# Patient Record
Sex: MALE | Race: OTHER | Employment: FULL TIME | URBAN - METROPOLITAN AREA
[De-identification: names, ages, dates, MRNs, and addresses within clinical notes are randomized per-mention and may not be internally consistent; named-entity substitution may affect disease eponyms.]

---

## 2021-04-14 ENCOUNTER — HOSPITAL ENCOUNTER (OUTPATIENT)
Dept: PHYSICAL THERAPY | Age: 51
Discharge: HOME | End: 2021-04-14
Payer: COMMERCIAL

## 2021-04-14 PROCEDURE — 97112 NEUROMUSCULAR REEDUCATION: CPT

## 2021-04-14 PROCEDURE — 97162 PT EVAL MOD COMPLEX 30 MIN: CPT
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
## 2021-04-14 NOTE — PROGRESS NOTES
PHYSICAL THERAPY - DAILY TREATMENT NOTE     Patient Name: Radha Officer        Date: 2021  : 1970   YES Patient  Verified  Visit #:   1     (5)  Insurance: Payor: Calderon Garcias / Plan: Allyson Roach RPN / Product Type: Commerical /      In time: 410 Out time: 500   Total Treatment Time: 50     Medicare/[facility] Time Tracking (below)   Total Timed Codes (min):   1:1 Treatment Time:       TREATMENT AREA =  Low back pain [M54.5]  Pain in right shoulder [M25.511]  Concussion [S06.0X9A]    SUBJECTIVE    Pain Level (on 0 to 10 scale):  4  / 10   Medication Changes/New allergies or changes in medical history, any new surgeries or procedures?     NO    If yes, update Summary List   Subjective Functional Status/Changes:  []  No changes reported     See eval /POC         OBJECTIVE  Modalities Rationale:     decrease pain to improve patient's ability to return to PLOF      min [] Estim, type/location:                                      []  att     []  unatt     []  w/US     []  w/ice    []  w/heat    min []  Mechanical Traction: type/lbs                   []  pro   []  sup   []  int   []  cont    []  before manual    []  after manual    min []  Ultrasound, settings/location:      min []  Iontophoresis w/ dexamethasone, location:                                               []  take home patch       []  in clinic    min []  Ice     []  Heat    location/position:     min []  Vasopneumatic Device, press/temp:     min []  Other:    [] Skin assessment post-treatment (if applicable):    []  intact    []  redness- no adverse reaction     []redness  adverse reaction:       min Therapeutic Exercise:  [x]  See flow sheet   Rationale:      increase ROM and increase strength to improve the patients ability to return to PLOF      min Manual Therapy: Technique:      [] S/DTM []IASTM []PROM [] Passive Stretching   []manual TPR    []Jt manipulation:Gr I [] II []  III [] IV[]  []REIL with manual OP  Treatment Area: Rationale:      decrease pain, increase ROM, increase tissue extensibility and decrease trigger points to improve patient's ability to return to PLOF    10 min Neuromuscular Re-ed: [x]  See flow sheet   Rationale:      improve coordination, improve balance, increase proprioception and dec dizziness to improve the patients ability to return to PLOF        min Self Care:    Rationale:    increase ROM, increase strength and improve coordination to improve the patients ability to return to PLOF    Billed With/As:   [] TE   [] TA   [] Neuro   [] Self Care Patient Education: [x] Review HEP    [] Progressed/Changed HEP based on:   [] positioning   [] body mechanics   [] transfers   [] heat/ice application    [] other:        Other Objective/Functional Measures:    See eval/ POC     Post Treatment Pain Level (on 0 to 10) scale:   4 / 10     ASSESSMENT    X  See POC     PLAN    [x]  Upgrade activities as tolerated {YES) Continue plan of care   []  Discharge due to :    []  Other:      Therapist: Chelsie Barnett PT    Date: 4/14/2021 Time: 5:28 PM     Future Appointments   Date Time Provider Lena Quesada   4/19/2021  6:00 PM [facility] [place] [facility]   4/21/2021  3:30 PM Colleen Garcia, PT [facility]   4/26/2021  6:00 PM [facility] [place] [facility]   4/29/2021  6:00 PM [facility]

## 2021-04-19 ENCOUNTER — HOSPITAL ENCOUNTER (OUTPATIENT)
Dept: PHYSICAL THERAPY | Age: 51
Discharge: HOME | End: 2021-04-19
Payer: COMMERCIAL

## 2021-04-19 PROCEDURE — 97110 THERAPEUTIC EXERCISES: CPT | Performed by: GENERAL ACUTE CARE HOSPITAL

## 2021-04-19 NOTE — PROGRESS NOTES
PHYSICAL THERAPY - DAILY TREATMENT NOTE     Patient Name: Radha Duran        Date: 2021  : 1970   YES Patient  Verified  Visit #:   2     Insurance: Payor: Rusty Jacinto / Plan: [id number] [place] RPN / Product Type: Commerical /      In time: 6:02 Out time: 6:47   Total Treatment Time: 45     Medicare/[employer] Time Tracking (below)   Total Timed Codes (min):  45 1:1 Treatment Time:  45     TREATMENT AREA =  Low back pain [M54.5]  Pain in right shoulder [M25.511]  Concussion [S06.0X9A]    SUBJECTIVE    Pain Level (on 0 to 10 scale):  5-6  / 10   Medication Changes/New allergies or changes in medical history, any new surgeries or procedures? NO    If yes, update Summary List   Subjective Functional Status/Changes:  []  No changes reported     Pt reports going out of town to relax this weekend, which helped with his neck pain. Reports stressful life events causing increased pain upon return. Having ongoing neck pain and stiffness. Reports neck > low back pain. OBJECTIVE    45 min Therapeutic Exercise:  [x]  See flow sheet   Rationale:      increase ROM and increase strength to improve the patients ability to perform ADL's and work related tasks      Billed With/As:   [] TE   [] TA   [] Neuro   [] Self Care Patient Education: [x] Review HEP    [] Progressed/Changed HEP based on:   [] positioning   [] body mechanics   [] transfers   [] heat/ice application    [] other:        Other Objective/Functional Measures:    Initiated exercises per FS     Post Treatment Pain Level (on 0 to 10) scale:   4  / 10     ASSESSMENT    Assessment/Changes in Function:     Fair tolerance to initial treatment session. Pt required 100% VC's for all newly introduced exercises. Pt was heavily cued to perform exercises in a pain free range, but patient frequently wanting to push himself further. Educated on importance of posture to reduce strain on neck, especially while at work, and to avoid forward bending.       [] See Progress Note/Recertification   Patient will continue to benefit from skilled PT services to modify and progress therapeutic interventions, address functional mobility deficits, address ROM deficits, address strength deficits, analyze and address soft tissue restrictions, analyze and cue movement patterns, analyze and modify body mechanics/ergonomics, assess and modify postural abnormalities, address imbalance/dizziness and instruct in home and community integration to attain remaining goals. Progress toward goals / Updated goals: · Short Term Goals: To be accomplished in 4-6  treatments:  1. Patient will report at least 25% reduction of symptoms with ADLs. Progressing: reduced pain post exercises today (4/19/21)  2. Patient will be independent and complaint with HEP TID to improve mobility,  reduce imbalance and dizziness with ADLs. 3. Increase L shoulder flex/ scap >= 15 degrees for improved OH reach,   4. Increase Lx ROM >= 50% in all planes for increased ADL participation  · Long Term Goals: To be accomplished in 10-12 treatments:  1. Patient will report at least 50% reduction of symptoms with ADLs. 2. Patient will be independent with self progression of HEP and demonstrate willingness to continue HEP after D/C to maximize/maintain gains in functional mobility. 3. CSE will improve to less than or equal to 25/132 points to demonstrate significant reduction of dizziness and imbalance with ADLs. 4. Increase FGA score >= 25/30 to indicate improved functional gait. 5. Restore ROM is CS, LS, L shoulder >= 75% of WNL for improved ADL/ work participation.        PLAN    [x]  Upgrade activities as tolerated YES Continue plan of care   []  Discharge due to :    []  Other:      Therapist: Chiquis Guerrero PT    Date: 4/19/2021 Time: 10:43 AM     Future Appointments   Date Time Provider Lena Quesada   4/19/2021  6:00 PM Aster6 [place] PT [address] [place]   4/21/2021  3:30 PM Eav Shrestha PT [facility] [address] [place] 4/26/2021  6:00 PM [facility] [place] [facility]   4/29/2021  6:00 PM Hinda Lesch, PTA [facility]

## 2021-04-22 ENCOUNTER — HOSPITAL ENCOUNTER (OUTPATIENT)
Dept: PHYSICAL THERAPY | Age: 51
Discharge: HOME | End: 2021-04-22
Payer: COMMERCIAL

## 2021-04-22 PROCEDURE — 97110 THERAPEUTIC EXERCISES: CPT

## 2021-04-22 NOTE — PROGRESS NOTES
PHYSICAL THERAPY - DAILY TREATMENT NOTE     Patient Name: Haider Aleman        Date: 2021  : 1970   YES Patient  Verified  Visit #:   3   Insurance: Payor: Megan Mustapha / Plan: Warner KELLEY / Product Type: Commerical /      In time: 5:05 Out time: 5:50   Total Treatment Time: 45     Medicare/[facility] Time Tracking (below)   Total Timed Codes (min):   1:1 Treatment Time:       TREATMENT AREA =  Low back pain [M54.5]  Pain in right shoulder [M25.511]  Concussion [S06.0X9A]    SUBJECTIVE    Pain Level (on 0 to 10 scale):  -6  / 10   Medication Changes/New allergies or changes in medical history, any new surgeries or procedures? NO    If yes, update Summary List   Subjective Functional Status/Changes:  []  No changes reported     \"Headaches everyday but less intense. Sensitive to light and sound. Pain in the R side low back that comes and goes everyday; worse in the mornings. I take ice baths almost every day at home. \"        OBJECTIVE    45 min Therapeutic Exercise:  [x]  See flow sheet   Rationale:      increase ROM and increase strength to improve the patients ability to perform ADL's and work related tasks      Billed With/As:   [x] TE   [] TA   [] Neuro   [] Self Care Patient Education: [x] Review HEP    [] Progressed/Changed HEP based on:   [] positioning   [] body mechanics   [] transfers   [] heat/ice application    [] other:        Other Objective/Functional Measures:    Challenged with SLS. Increases headache.      Post Treatment Pain Level (on 0 to 10) scale:    10     ASSESSMENT    Assessment/Changes in Function:     Functional Impairments: daily headaches and R>L LBP     []  See Progress Note/Recertification   Patient will continue to benefit from skilled PT services to modify and progress therapeutic interventions, address functional mobility deficits, address ROM deficits, address strength deficits, analyze and address soft tissue restrictions, analyze and cue movement patterns, analyze and modify body mechanics/ergonomics, assess and modify postural abnormalities, address imbalance/dizziness and instruct in home and community integration to attain remaining goals. Progress toward goals / Updated goals: · Short Term Goals: To be accomplished in 4-6  treatments:  1. Patient will report at least 25% reduction of symptoms with ADLs. Progressing: reduced pain post exercises today (4/19/21)  2. Patient will be independent and complaint with HEP TID to improve mobility,  reduce imbalance and dizziness with ADLs. 3. Increase L shoulder flex/ scap >= 15 degrees for improved OH reach,   4. Increase Lx ROM >= 50% in all planes for increased ADL participation  · Long Term Goals: To be accomplished in 10-12 treatments:  1. Patient will report at least 50% reduction of symptoms with ADLs. 2. Patient will be independent with self progression of HEP and demonstrate willingness to continue HEP after D/C to maximize/maintain gains in functional mobility. 3. CSE will improve to less than or equal to 25/132 points to demonstrate significant reduction of dizziness and imbalance with ADLs. 4. Increase FGA score >= 25/30 to indicate improved functional gait. 5. Restore ROM is CS, LS, L shoulder >= 75% of WNL for improved ADL/ work participation.        PLAN    [x]  Upgrade activities as tolerated YES Continue plan of care   []  Discharge due to :    []  Other:      Therapist: Katiuska Coffman PTA    Date: 4/22/2021 Time: 10:43 AM     Future Appointments   Date Time Provider Lena Quesada   4/22/2021  5:15 PM Frederick Vu PTA [facility]   4/26/2021  6:00 PM [facility]   4/29/2021  6:00 PM Frederick Vu PTA [facility]

## 2021-04-26 ENCOUNTER — HOSPITAL ENCOUNTER (OUTPATIENT)
Dept: PHYSICAL THERAPY | Age: 51
Discharge: HOME | End: 2021-04-26
Payer: COMMERCIAL

## 2021-04-26 PROCEDURE — 97110 THERAPEUTIC EXERCISES: CPT | Performed by: GENERAL ACUTE CARE HOSPITAL

## 2021-04-26 NOTE — PROGRESS NOTES
PHYSICAL THERAPY - DAILY TREATMENT NOTE     Patient Name: Leeann Sánchez        Date: 2021  : 1970   YES Patient  Verified  Visit #:   3 of     Insurance: Payor: Caron Cid / Plan: Eliana Beckwith RPN / Product Type: Commerical /      In time: 6:03 Out time: 6:52   Total Treatment Time: 49     Medicare/[facility] Time Tracking (below)   Total Timed Codes (min):   1:1 Treatment Time:       TREATMENT AREA =  Low back pain [M54.5]  Pain in right shoulder [M25.511]  Concussion [S06.0X9A]    SUBJECTIVE    Pain Level (on 0 to 10 scale): 4 / 10   Medication Changes/New allergies or changes in medical history, any new surgeries or procedures? NO    If yes, update Summary List   Subjective Functional Status/Changes:  []  No changes reported     Pt reports mild improvement in pain, but states this fluctuates. Taking ice baths for pain management. OBJECTIVE    49 min Therapeutic Exercise:  [x]  See flow sheet   Rationale:      increase ROM and increase strength to improve the patients ability to perform ADL's and work related tasks      Billed With/As:   [x] TE   [] TA   [] Neuro   [] Self Care Patient Education: [x] Review HEP    [] Progressed/Changed HEP based on:   [] positioning   [] body mechanics   [] transfers   [] heat/ice application    [] other:        Other Objective/Functional Measures:    Shoulder scap AROM: L 150, R 120  C/S AROM: flex 45, ext 33  L/S AROM: flex to mid shin, ext 50%, SB 50%, Rot 50%   Improvements: reduced HA's, slowly improving mobility        Post Treatment Pain Level (on 0 to 10) scale:    10      ASSESSMENT    Assessment/Changes in Function:     Pt reports reduced headaches, notes they appear to be exacerbated by stress. Pt reports ongoing occasional numbness into bilateral finger tips - educated on importance of posture. PN due next visit for insurance auth. Discussed continuation of therapy and patient is considering DC to continue exercises at home.  Plan to have discussion [place] about need for continuation v readiness for DC.      []  See Progress Note/Recertification   Patient will continue to benefit from skilled PT services to modify and progress therapeutic interventions, address functional mobility deficits, address ROM deficits, address strength deficits, analyze and address soft tissue restrictions, analyze and cue movement patterns, analyze and modify body mechanics/ergonomics, assess and modify postural abnormalities, address imbalance/dizziness and instruct in home and community integration to attain remaining goals. Progress toward goals / Updated goals: · Short Term Goals: To be accomplished in 4-6  treatments:  1. Patient will report at least 25% reduction of symptoms with ADLs. Progressing: notes improved mobility and reduced HAs (4/26/21)  2. Patient will be independent and complaint with HEP to improve mobility,  reduce imbalance and dizziness with ADLs. Met: pt notes daily to BID compliance (4/26/2021)  3. Increase L shoulder flex/ scap >= 15 degrees for improved OH reach Met: Shoulder scap AROM: L 150, R 120 (4/26/21)  4. Increase Lx ROM >= 50% in all planes for increased ADL participation Met: L/S AROM: flex to mid shin, ext 50%, SB 50%, Rot 50% (4/26/21)  · Long Term Goals: To be accomplished in 10-12 treatments:  1. Patient will report at least 50% reduction of symptoms with ADLs. 2. Patient will be independent with self progression of HEP and demonstrate willingness to continue HEP after D/C to maximize/maintain gains in functional mobility. 3. CSE will improve to less than or equal to 25/132 points to demonstrate significant reduction of dizziness and imbalance with ADLs. 4. Increase FGA score >= 25/30 to indicate improved functional gait. 5. Restore ROM is CS, LS, L shoulder >= 75% of WNL for improved ADL/ work participation.        PLAN    [x]  Upgrade activities as tolerated YES Continue plan of care   []  Discharge due to :    []  Other: Therapist: Asif Cruz, PT    Date: 4/26/2021 Time: 10:43 AM     Future Appointments   Date Time Provider Lena Quesada   4/26/2021  6:00 PM [facility] PT [address] [facility]   4/29/2021  6:00 PM Joshua Schafer PTA [facility]

## 2021-05-05 ENCOUNTER — HOSPITAL ENCOUNTER (OUTPATIENT)
Dept: PHYSICAL THERAPY | Age: 51
Discharge: HOME | End: 2021-05-05
Payer: COMMERCIAL

## 2021-05-05 PROCEDURE — 97110 THERAPEUTIC EXERCISES: CPT
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
## 2022-04-06 NOTE — PROGRESS NOTES
PHYSICAL THERAPY - DAILY TREATMENT NOTE      Patient Name: Maggi Dutton        Date: 2021  : 1970   YES Patient  Verified  Visit #:   5   of   5  Insurance: Payor: Esperanza Cobb / Plan: Dc Boyce RPN / Product Type: Commerical /      In time: 5:00 Out time: 5:40   Total Treatment Time: 40     Medicare/[facility] Time Tracking (below)   Total Timed Codes (min):  n/a 1:1 Treatment Time:  n/a     TREATMENT AREA =  Low back pain [M54.5]  Pain in right shoulder [M25.511]  Concussion [S06.0X9A]    SUBJECTIVE    Pain Level (on 0 to 10 scale):  2  / 10   Medication Changes/New allergies or changes in medical history, any new surgeries or procedures? NO    If yes, update Summary List   Subjective Functional Status/Changes:  []  No changes reported       See PN       OBJECTIVE      40 min Therapeutic Exercise:  [x]  See flow sheet   Rationale:      increase ROM and increase strength to improve the patients ability to perform work/ADLs     Billed With/As:   [x] TE   [] TA   [] Neuro   [] Self Care Patient Education: [x] Review HEP    [] Progressed/Changed HEP based on:   [] positioning   [] body mechanics   [] transfers   [] heat/ice application    [] other:      Other Objective/Functional Measures:    See PN     Post Treatment Pain Level (on 0 to 10) scale:   2  / 10     ASSESSMENT    Assessment/Changes in Function:     See PN     []  See Progress Note/Recertification   Patient will continue to benefit from skilled PT services to modify and progress therapeutic interventions, address functional mobility deficits, address ROM deficits, address strength deficits, analyze and address soft tissue restrictions, analyze and cue movement patterns, analyze and modify body mechanics/ergonomics and assess and modify postural abnormalities to attain remaining goals. to attain remaining goals.    Progress toward goals:    See PN     PLAN    [x]  Upgrade activities as tolerated YES Continue plan of care   []  Discharge due to :    []  Other:      Therapist: Dariela Edwards PT    Date: 5/5/2021 Time: 5:06 PM   No future appointments. Yes